# Patient Record
(demographics unavailable — no encounter records)

---

## 2025-02-19 NOTE — RESULTS/DATA
[TextEntry] : TTE 7/7/2023 CONCLUSIONS:  1. Normal left ventricular cavity size. The left ventricular wall thickness is normal. The left ventricular systolic function is normal with an ejection fraction of 66 % by Ferguson's method of disks. There are no regional wall motion abnormalities seen.  2. Normal right ventricular cavity size, normal right ventricular wall thickness and normal right ventricular systolic function. The tricuspid annular plane systolic excursion (TAPSE) is 1.6 cm (normal >=1.7 cm).  3. Mild-to-moderate aortic regurgitation.  4. Moderate-to-severe aortic stenosis (probably severe). The peak transaortic velocity is 3.93 m/s, peak transaortic gradient is 61.8 mmHg and mean transaortic gradient is 37.0 mmHg. The aortic valve area is estimated at 1.12 cm? by the continuity equation. There is mild-to-moderate aortic regurgitation. Recommend CT or SAMEERA for further evaluation of aortic valve severity if clinically indicated.  5. No prior echocardiogram is available for comparison.  TTE 6/8/23 CONCLUSIONS: 1. Mild symmetric left ventricular hypertrophy. 2. Normal left ventricular systolic function. 3. Normal right ventricular size and systolic function. 4. Normal atria. 5. There is moderate-to-severe aortic stenosis. The peak transvalvular velocity is 4.03 m/s, the mean transvalvular gradient is 32.41 mmHg, and the LVOT/AV velocity ratio is 0.31. The peak transaortic gradient is 64.89 mmHg. The aortic valve area (estimated via the continuity method) is 0.97 cm. The calculated aortic valve area indexed to body surface area is 0.45 cm/m. 6. There is moderate aortic regurgitation. The aortic regurgitation jet is eccentric and severity may be underestimated. 7. There was insufficient tricuspid regurgitation detected from which to calculate pulmonary artery systolic pressure. 8. No pericardial effusion. 9. Compared to the previous TTE performed on 6/6/2023, there is an improvement in transaortic valve gradients.  Cath procedure 6/7/23 Successful BAV with 22mmg True Flow balloon with reduction in gradient (10 mmHg post-BAV, from 44mmHg on TTE).  TTE 6/6/23 CONCLUSIONS: 1. Normal left and right ventricular size and systolic function. 2. Mildly dilated left atrium. 3. Severe aortic stenosis (PV 4.46, MG 44, CONSTANTIN 0.71).  4. No other significant valvular disease. 5. No evidence of pulmonary hypertension. 6. No pericardial effusion.  Cath 12/29/2022 Conclusions: Severe ostial OM1 stenosis s/p POBA

## 2025-02-19 NOTE — HISTORY OF PRESENT ILLNESS
[FreeTextEntry1] : 83 Y male with PMH of asthma/COPD/pulmonary fibrosis, BPH, varicose veins, chronic LE edema (R >L), anemia (baseline Hgb 10-12), GERD, gout, HTN, HLD, T2DM (HbA1c 6.6), AAA s/p EVAR, PAD (CT 6/2023: w/ dissection in R CI extending into the R CFA and PFA and ISR of L CI stent), CAD s/p PCI in 2020, RBBB, and symptomatic severe AS s/p BAV (6/8/23), who presents today for follow-up.  Presented to [facility] on 5/30/2023 with dizziness/weakness --> fall --> head strike --> LOC. Associated symptoms included chest pain, SOB. Inpatient w/up at OSH showed hyponatremia and low cortisol levels, which endocrine felt was likely adrenal suppression from exogenous steroid administration for pulmonary fibrosis flares. [facility] that showed 2vCAD and echo with evidence of severe aortic stenosis (PV 4.46, MG 44, AVA0.71). Transferred to [facility] for AV assessment. CT demonstrated severely calcified tricuspid valve w/horizontal orientation and severe PAD w/dissection in R CI extending into the R CFA and PFA and ISR of L CI stent. Underwent successful BAV on 6/7/23 via LFA.   Was hospitalized at [facility] in July 2023 for LE edema, atypical chest pain and dizziness, ruled out for AMI with non-ischemic EKG and hs-troponin of 53 > 52 > 56. CTA chest negative for PE. TTE on 7/7/23 showed LVEF 66%, no regional WMA, normal RV size and function, mild to mod AI, mod to severe AS (PV 3.93, MG 37, CONSTANTIN 1.12). Cardiology was consulted and deemed chest pain to be non-cardiac in etiology with recommendation to follow up with [facility] clinic. Hospital course was notable for diarrhea found to have +enterotoxigenic E.coli and Norovirus.  Hospitalized at [facility] from 8/8-8/10/23 for AMS found to have hypoglycemia with FS of 20, treated with D50 with improvement of mental status back to baseline. [facility] on 8/8/23 showed mild atrophy and moderate periventricular white matter ischemic changes. No acute bleed. There are no findings of an acute ischemic infarction.   Patient and daughter are poor historian, the above were obtained from chart review. Since last seen by D team in June 2023, patient's daughter endorsed that her dad has only had those two admissions. Has not seen his local cardiologist Dr. Lin, last saw pulmonary in July 2023, but endorsed that her dad has had blood work at his PCP's office within the last month that were all "ok" and has had dental work done.    Patient currently lives alone in an apartment with his daughters coming regularly to help with his basic and high-level ADLs. Patient uses a cane for balance for short distance and a walker for longer distance walks and needs 2 people assists when going up and down stairs. He mainly stays at home and doesn't go out alone due to his balance issue and high risk for fall. Patient endorsed ongoing chronic dyspnea on exertion that his daughter attributes to his underlying lung disease, chronic LE edema (L >R), occasional lightheadedness/dizziness occurring at random, and decrease appetite.  The patient was seen in 10/2023 in which he was lost to follow up after multiple attempts.  Since his last visit, the patient states...  The patient has an ECHO scheduled today.  Care Team:  Cards: Dr. Lin Pulm: Dr. Buchanan

## 2025-02-19 NOTE — REVIEW OF SYSTEMS
[SOB] : no shortness of breath [Chest Discomfort] : no chest discomfort [Leg Claudication] : no intermittent leg claudication [Palpitations] : no palpitations [Orthopnea] : no orthopnea [PND] : no PND [Syncope] : no syncope [Abdominal Pain] : no abdominal pain [Nausea] : no nausea [Vomiting] : no vomiting [Heartburn] : no heartburn [Change In The Stool] : no change in stool [Dysphagia] : no dysphagia [Diarrhea] : diarrhea [Constipation] : no constipation [Blood in stool] : no blood in stoo [Tremor] : no tremor was seen [Numbness (Hypoesthesia)] : no numbness [Convulsions] : no convulsions [Tingling (Paresthesia)] : no tingling [Weakness] : no weakness [Limb Weakness (Paresis)] : no limb weakness (Paresis) [Speech Disturbance] : no speech disturbance [FreeTextEntry4] : see hpi